# Patient Record
Sex: FEMALE | Race: WHITE | NOT HISPANIC OR LATINO | URBAN - METROPOLITAN AREA
[De-identification: names, ages, dates, MRNs, and addresses within clinical notes are randomized per-mention and may not be internally consistent; named-entity substitution may affect disease eponyms.]

---

## 2022-08-26 ENCOUNTER — OFFICE (OUTPATIENT)
Dept: URBAN - METROPOLITAN AREA CLINIC 79 | Facility: CLINIC | Age: 47
End: 2022-08-26

## 2022-08-26 VITALS
SYSTOLIC BLOOD PRESSURE: 136 MMHG | TEMPERATURE: 96.6 F | HEART RATE: 76 BPM | HEIGHT: 67 IN | WEIGHT: 197.2 LBS | DIASTOLIC BLOOD PRESSURE: 93 MMHG

## 2022-08-26 DIAGNOSIS — D68.61 ANTIPHOSPHOLIPID SYNDROME: ICD-10-CM

## 2022-08-26 DIAGNOSIS — E61.1 IRON DEFICIENCY: ICD-10-CM

## 2022-08-26 DIAGNOSIS — Z79.01 LONG TERM (CURRENT) USE OF ANTICOAGULANTS: ICD-10-CM

## 2022-08-26 DIAGNOSIS — Z86.718 PERSONAL HISTORY OF OTHER VENOUS THROMBOSIS AND EMBOLISM: ICD-10-CM

## 2022-08-26 DIAGNOSIS — K91.850 POUCHITIS: ICD-10-CM

## 2022-08-26 DIAGNOSIS — N28.1 CYST OF KIDNEY, ACQUIRED: ICD-10-CM

## 2022-08-26 DIAGNOSIS — R79.89 OTHER SPECIFIED ABNORMAL FINDINGS OF BLOOD CHEMISTRY: ICD-10-CM

## 2022-08-26 LAB
C-REACTIVE PROTEIN, QUANT: 1 MG/L (ref 0–10)
CBC/DIFF AMBIGUOUS DEFAULT: BASO (ABSOLUTE): 0.1 X10E3/UL (ref 0–0.2)
CBC/DIFF AMBIGUOUS DEFAULT: BASOS: 1 %
CBC/DIFF AMBIGUOUS DEFAULT: EOS (ABSOLUTE): 0.1 X10E3/UL (ref 0–0.4)
CBC/DIFF AMBIGUOUS DEFAULT: EOS: 1 %
CBC/DIFF AMBIGUOUS DEFAULT: HEMATOCRIT: 45.6 % (ref 34–46.6)
CBC/DIFF AMBIGUOUS DEFAULT: HEMATOLOGY COMMENTS: (no result)
CBC/DIFF AMBIGUOUS DEFAULT: HEMOGLOBIN: 14.7 G/DL (ref 11.1–15.9)
CBC/DIFF AMBIGUOUS DEFAULT: IMMATURE CELLS: (no result)
CBC/DIFF AMBIGUOUS DEFAULT: IMMATURE GRANS (ABS): 0 X10E3/UL (ref 0–0.1)
CBC/DIFF AMBIGUOUS DEFAULT: IMMATURE GRANULOCYTES: 0 %
CBC/DIFF AMBIGUOUS DEFAULT: LYMPHS (ABSOLUTE): 1.5 X10E3/UL (ref 0.7–3.1)
CBC/DIFF AMBIGUOUS DEFAULT: LYMPHS: 18 %
CBC/DIFF AMBIGUOUS DEFAULT: MCH: 29.3 PG (ref 26.6–33)
CBC/DIFF AMBIGUOUS DEFAULT: MCHC: 32.2 G/DL (ref 31.5–35.7)
CBC/DIFF AMBIGUOUS DEFAULT: MCV: 91 FL (ref 79–97)
CBC/DIFF AMBIGUOUS DEFAULT: MONOCYTES(ABSOLUTE): 0.7 X10E3/UL (ref 0.1–0.9)
CBC/DIFF AMBIGUOUS DEFAULT: MONOCYTES: 9 %
CBC/DIFF AMBIGUOUS DEFAULT: NEUTROPHILS (ABSOLUTE): 5.8 X10E3/UL (ref 1.4–7)
CBC/DIFF AMBIGUOUS DEFAULT: NEUTROPHILS: 71 %
CBC/DIFF AMBIGUOUS DEFAULT: NRBC: (no result)
CBC/DIFF AMBIGUOUS DEFAULT: PLATELETS: 298 X10E3/UL (ref 150–450)
CBC/DIFF AMBIGUOUS DEFAULT: RBC: 5.02 X10E6/UL (ref 3.77–5.28)
CBC/DIFF AMBIGUOUS DEFAULT: RDW: 12.7 % (ref 11.7–15.4)
CBC/DIFF AMBIGUOUS DEFAULT: WBC: 8.2 X10E3/UL (ref 3.4–10.8)
COMP. METABOLIC PANEL (14): A/G RATIO: 1.6 (ref 1.2–2.2)
COMP. METABOLIC PANEL (14): ALBUMIN: 4.5 G/DL (ref 3.8–4.8)
COMP. METABOLIC PANEL (14): ALKALINE PHOSPHATASE: 79 IU/L (ref 44–121)
COMP. METABOLIC PANEL (14): ALT (SGPT): 17 IU/L (ref 0–32)
COMP. METABOLIC PANEL (14): AST (SGOT): 21 IU/L (ref 0–40)
COMP. METABOLIC PANEL (14): BILIRUBIN, TOTAL: 0.3 MG/DL (ref 0–1.2)
COMP. METABOLIC PANEL (14): BUN/CREATININE RATIO: 31 — HIGH (ref 9–23)
COMP. METABOLIC PANEL (14): BUN: 29 MG/DL — HIGH (ref 6–24)
COMP. METABOLIC PANEL (14): CALCIUM: 9.6 MG/DL (ref 8.7–10.2)
COMP. METABOLIC PANEL (14): CARBON DIOXIDE, TOTAL: 25 MMOL/L (ref 20–29)
COMP. METABOLIC PANEL (14): CHLORIDE: 100 MMOL/L (ref 96–106)
COMP. METABOLIC PANEL (14): CREATININE: 0.93 MG/DL (ref 0.57–1)
COMP. METABOLIC PANEL (14): EGFR: 77 ML/MIN/1.73 (ref 59–?)
COMP. METABOLIC PANEL (14): GLOBULIN, TOTAL: 2.8 G/DL (ref 1.5–4.5)
COMP. METABOLIC PANEL (14): GLUCOSE: 95 MG/DL (ref 65–99)
COMP. METABOLIC PANEL (14): POTASSIUM: 4.6 MMOL/L (ref 3.5–5.2)
COMP. METABOLIC PANEL (14): PROTEIN, TOTAL: 7.3 G/DL (ref 6–8.5)
COMP. METABOLIC PANEL (14): SODIUM: 140 MMOL/L (ref 134–144)
FERRITIN: 42 NG/ML (ref 15–150)
IRON AND TIBC: IRON BIND.CAP.(TIBC): 314 UG/DL (ref 250–450)
IRON AND TIBC: IRON SATURATION: 28 % (ref 15–55)
IRON AND TIBC: IRON: 87 UG/DL (ref 27–159)
IRON AND TIBC: UIBC: 227 UG/DL (ref 131–425)
SEDIMENTATION RATE-WESTERGREN: 10 MM/HR (ref 0–32)
VITAMIN B12 AND FOLATE: FOLATE (FOLIC ACID), SERUM: 8.5 NG/ML (ref 3–?)
VITAMIN B12 AND FOLATE: VITAMIN B12: 332 PG/ML (ref 232–1245)
VITAMIN D, 25-HYDROXY: 40.8 NG/ML (ref 30–100)

## 2022-08-26 PROCEDURE — 99245 OFF/OP CONSLTJ NEW/EST HI 55: CPT | Performed by: INTERNAL MEDICINE

## 2023-02-06 ENCOUNTER — OFFICE (OUTPATIENT)
Dept: URBAN - METROPOLITAN AREA CLINIC 34 | Facility: CLINIC | Age: 48
End: 2023-02-06

## 2023-02-06 VITALS
TEMPERATURE: 97.7 F | DIASTOLIC BLOOD PRESSURE: 87 MMHG | HEART RATE: 93 BPM | SYSTOLIC BLOOD PRESSURE: 119 MMHG | WEIGHT: 193 LBS | HEIGHT: 67 IN

## 2023-02-06 DIAGNOSIS — Z79.01 LONG TERM (CURRENT) USE OF ANTICOAGULANTS: ICD-10-CM

## 2023-02-06 DIAGNOSIS — D68.61 ANTIPHOSPHOLIPID SYNDROME: ICD-10-CM

## 2023-02-06 DIAGNOSIS — K91.850 POUCHITIS: ICD-10-CM

## 2023-02-06 DIAGNOSIS — Z86.718 PERSONAL HISTORY OF OTHER VENOUS THROMBOSIS AND EMBOLISM: ICD-10-CM

## 2023-02-06 PROCEDURE — 99214 OFFICE O/P EST MOD 30 MIN: CPT | Performed by: INTERNAL MEDICINE

## 2023-02-06 RX ORDER — METRONIDAZOLE 500 MG/1
TABLET ORAL
Qty: 28 | Refills: -1 | Status: COMPLETED
Start: 2023-02-06 | End: 2024-02-01

## 2023-03-13 ENCOUNTER — OFFICE (OUTPATIENT)
Dept: URBAN - METROPOLITAN AREA CLINIC 34 | Facility: CLINIC | Age: 48
End: 2023-03-13

## 2023-03-13 VITALS
WEIGHT: 194 LBS | HEIGHT: 67 IN | HEART RATE: 71 BPM | TEMPERATURE: 97.6 F | DIASTOLIC BLOOD PRESSURE: 89 MMHG | SYSTOLIC BLOOD PRESSURE: 128 MMHG

## 2023-03-13 DIAGNOSIS — K91.850 POUCHITIS: ICD-10-CM

## 2023-03-13 DIAGNOSIS — R15.9 FULL INCONTINENCE OF FECES: ICD-10-CM

## 2023-03-13 PROCEDURE — 99213 OFFICE O/P EST LOW 20 MIN: CPT | Performed by: INTERNAL MEDICINE

## 2023-03-13 RX ORDER — DIPHENOXYLATE HYDROCHLORIDE AND ATROPINE SULFATE 2.5; .025 MG/1; MG/1
TABLET ORAL
Qty: 30 | Refills: 1 | Status: COMPLETED
Start: 2023-03-13 | End: 2024-02-01

## 2024-02-01 ENCOUNTER — OFFICE (OUTPATIENT)
Dept: URBAN - METROPOLITAN AREA CLINIC 34 | Facility: CLINIC | Age: 49
End: 2024-02-01

## 2024-02-01 VITALS
DIASTOLIC BLOOD PRESSURE: 87 MMHG | TEMPERATURE: 97.5 F | WEIGHT: 202 LBS | HEIGHT: 67 IN | HEART RATE: 73 BPM | SYSTOLIC BLOOD PRESSURE: 148 MMHG

## 2024-02-01 DIAGNOSIS — Z79.01 LONG TERM (CURRENT) USE OF ANTICOAGULANTS: ICD-10-CM

## 2024-02-01 DIAGNOSIS — Z86.718 PERSONAL HISTORY OF OTHER VENOUS THROMBOSIS AND EMBOLISM: ICD-10-CM

## 2024-02-01 DIAGNOSIS — D68.61 ANTIPHOSPHOLIPID SYNDROME: ICD-10-CM

## 2024-02-01 DIAGNOSIS — K91.850 POUCHITIS: ICD-10-CM

## 2024-02-01 PROCEDURE — 99214 OFFICE O/P EST MOD 30 MIN: CPT | Performed by: INTERNAL MEDICINE

## 2024-07-23 ENCOUNTER — TELEHEALTH PROVIDED OTHER THAN IN PATIENT'S HOME (OUTPATIENT)
Dept: URBAN - METROPOLITAN AREA TELEHEALTH 12 | Facility: TELEHEALTH | Age: 49
End: 2024-07-23
Payer: COMMERCIAL

## 2024-07-23 VITALS — WEIGHT: 195 LBS | HEIGHT: 67 IN

## 2024-07-23 DIAGNOSIS — E66.9 OBESITY, UNSPECIFIED: ICD-10-CM

## 2024-07-23 DIAGNOSIS — N28.0 ISCHEMIA AND INFARCTION OF KIDNEY: ICD-10-CM

## 2024-07-23 DIAGNOSIS — D68.61 ANTIPHOSPHOLIPID SYNDROME: ICD-10-CM

## 2024-07-23 DIAGNOSIS — Z86.718 PERSONAL HISTORY OF OTHER VENOUS THROMBOSIS AND EMBOLISM: ICD-10-CM

## 2024-07-23 DIAGNOSIS — K91.850 POUCHITIS: ICD-10-CM

## 2024-07-23 DIAGNOSIS — Z79.01 LONG TERM (CURRENT) USE OF ANTICOAGULANTS: ICD-10-CM

## 2024-07-23 PROCEDURE — 99214 OFFICE O/P EST MOD 30 MIN: CPT | Mod: 95 | Performed by: PHYSICIAN ASSISTANT

## 2024-07-23 NOTE — SERVICENOTES
Patient was located in their home during visit., I spent 20 minutes today reviewing the chart, talking with the patient, reviewing previous results with patient, discussing plan, and documenting. Patient understands and agrees with plan. Questions/concerns addressed., Patient's visit was conducted through [employer] video telecommunication. Patient consented before the start of visit as to understanding of privacy concerns, possible technological failure, and their responsibility of carrying out instructions of plan.
normal

## 2024-07-23 NOTE — SERVICEHPINOTES
PATIENT VERIFIED BY DATE OF BIRTH AND NAME. Patient has been consented for this telecommunication visit using [employer] application.   Pt had a renal infarct recently--has questions about her diet--has a lower GFR, wants to know about sodium balance--how to follow more a plant based protein diet. Pt has a hard time following the low FODMAP diet. No change in GI symptoms--overall doing ok w/o any changes. Will occasionally see blood and mucus but not very often (and always has). No nausea or vomiting. Continue to take loperamide 2-3 in the AM, occasionally in the pm. Pt has a script for this. Pt is taking visbiome. She is on Warfarin now--was on Xarelto previously. Pt has a pretty good handle on how to manage coumadin levels--has been on it in the past.  ROS as per HPI and o/w is unremarkable. No other GI related complaints today.